# Patient Record
Sex: MALE | Race: WHITE
[De-identification: names, ages, dates, MRNs, and addresses within clinical notes are randomized per-mention and may not be internally consistent; named-entity substitution may affect disease eponyms.]

---

## 2020-11-24 PROBLEM — Z00.00 ENCOUNTER FOR PREVENTIVE HEALTH EXAMINATION: Status: ACTIVE | Noted: 2020-11-24

## 2020-12-02 ENCOUNTER — APPOINTMENT (OUTPATIENT)
Dept: VASCULAR SURGERY | Facility: CLINIC | Age: 30
End: 2020-12-02
Payer: MEDICAID

## 2020-12-02 DIAGNOSIS — I77.4 CELIAC ARTERY COMPRESSION SYNDROME: ICD-10-CM

## 2020-12-02 PROCEDURE — 99072 ADDL SUPL MATRL&STAF TM PHE: CPT

## 2020-12-02 PROCEDURE — 99204 OFFICE O/P NEW MOD 45 MIN: CPT

## 2020-12-05 NOTE — HISTORY OF PRESENT ILLNESS
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 Pt referred by Dr. Orquidea Amaro. He reports a kunz bed fell over him and he loss consciousness for a little while. He was seen at outside ED and underwent a series of studies with the incidental finding of narrowing of celiac artery. No other significant findings were found. Denies any abdominal pain, post prandial pain, constipation, diarrhea, nausea, bloating or weight loss.  \par \par No FMhx of PVD, DVT, stroke, aneurysms. \par \par Pt accompanied by his mother. \par \par Pt works in construction photography.\par Non smoker

## 2020-12-05 NOTE — CONSULT LETTER
[Dear  ___] : Dear  [unfilled], [FreeTextEntry2] : Orquidea Amaro MD\par [address]\par [place] [address] [FreeTextEntry1] : Thank you for referring Mr Dinesh Katz. He is accompanied by his mother and brings [facility] from [facility].   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  He is asymptomatic and denies any post prandial pain, weight loss, nausea or abdominal discomfort. \par \par CT Angiograms and reports were reviewed. There is mild celiac artery narrowing at the origin of the celiac at the level of the diaphragm.  Other visceral vessels are patent and without stenosis. 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  He does not have symptoms referable to this.  I had a thorough discussion about this with he and his mother.  I reassured them that this should not be a problem to him at all in the future.  If he were to become symptomatic, which I do not foresee happening, he knows to contact me for further evaluation.   No further diagnostic or therapeutic intervention is recommended at this time.  \par \par My complete EMR office note is below for your records.  [FreeTextEntry3] : Sincerely, \par \par Orlando Randle M.D. \par , [facility]\par , Department of Surgery [facility]\par Professor of Surgery, [employer] at [facility]

## 2020-12-05 NOTE — ASSESSMENT
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 Discussed with pt findings are non concerning given he is asymptomatic. No vascular surgery intervention is required at this moment. Outside medical records and CT imaging reviewed and results explained in detail. Recommended pt continue his daily activities as tolerated, if at any time he does experience any out of proportion postprandial, abdominal pain to contact the office for further evaluation. He will continue to f/u with us here PRN. \par

## 2020-12-05 NOTE — ADDENDUM
[FreeTextEntry1] : This note was written by Mita Bui on 12/02/2020 acting as scribe for Orlando Cantu M.D.\par \par I, Orlando Grajeda  have read and attest that all the information, medical decision making and discharge instructions within are true and accurate.

## 2020-12-05 NOTE — PHYSICAL EXAM
[Respiratory Effort] : normal respiratory effort [Normal Rate and Rhythm] : normal rate and rhythm [Alert] : alert [Oriented to Person] : oriented to person [Oriented to Place] : oriented to place [Oriented to Time] : oriented to time [Calm] : calm [2+] : left 2+ [No Rash or Lesion] : No rash or lesion [Ankle Swelling (On Exam)] : not present [Varicose Veins Of Lower Extremities] : not present [] : not present [Abdomen Tenderness] : ~T ~M No abdominal tenderness [de-identified] : Calm, cooperative, articulate and friendly.   [de-identified] : NC/AT  [de-identified] : Supple  [de-identified] : FROM 5/5 x4